# Patient Record
Sex: MALE | Race: WHITE
[De-identification: names, ages, dates, MRNs, and addresses within clinical notes are randomized per-mention and may not be internally consistent; named-entity substitution may affect disease eponyms.]

---

## 2020-09-05 ENCOUNTER — HOSPITAL ENCOUNTER (EMERGENCY)
Dept: HOSPITAL 74 - FER | Age: 35
Discharge: HOME | End: 2020-09-05
Payer: COMMERCIAL

## 2020-09-05 ENCOUNTER — HOSPITAL ENCOUNTER (EMERGENCY)
Dept: HOSPITAL 74 - JVIRT | Age: 35
Discharge: HOME | End: 2020-09-05
Payer: COMMERCIAL

## 2020-09-05 VITALS — SYSTOLIC BLOOD PRESSURE: 161 MMHG | DIASTOLIC BLOOD PRESSURE: 91 MMHG | HEART RATE: 91 BPM | TEMPERATURE: 98.8 F

## 2020-09-05 VITALS — BODY MASS INDEX: 38 KG/M2

## 2020-09-05 DIAGNOSIS — Z01.84: Primary | ICD-10-CM

## 2020-09-05 DIAGNOSIS — S93.402A: Primary | ICD-10-CM

## 2020-09-05 DIAGNOSIS — Z11.59: ICD-10-CM

## 2020-09-05 DIAGNOSIS — R10.9: ICD-10-CM

## 2020-09-05 PROCEDURE — U0003 INFECTIOUS AGENT DETECTION BY NUCLEIC ACID (DNA OR RNA); SEVERE ACUTE RESPIRATORY SYNDROME CORONAVIRUS 2 (SARS-COV-2) (CORONAVIRUS DISEASE [COVID-19]), AMPLIFIED PROBE TECHNIQUE, MAKING USE OF HIGH THROUGHPUT TECHNOLOGIES AS DESCRIBED BY CMS-2020-01-R: HCPCS

## 2020-09-05 NOTE — TELE
HPI


Do you have fever,cough or shortness of breath?: No





- General


Reason For Visit: COVILD 19 TEST


Time Seen by Provider: 09/05/20 14:11


History Source: Patient


Exam Limitations: No Limitations





- History of Present Illness





09/05/20 14:11


History of presenting illness: 34-year-old male with telehealth visit for 

healing well COVID testing.  Patient reports he has had diarrhea over the past 5

to 6 days which she reports is yellow.  He states he had one episode of blood-

streaked but attributed to increased rectal irritation from multiple episodes of

diarrhea.  Patient denied abdominal pain.  Patient reports his wife and children

are having similar symptoms and nobody has had fevers.  Patient states his wife 

told him to call and get tested for COVID.





CONSTITUTIONAL:


Absent: fever, chills, diaphoresis, generalized weakness, malaise, loss of 

appetite


HEENT:


Absent: rhinorrhea, nasal congestion, throat pain, throat swelling, difficulty 

swallowing, mouth swelling, ear pain, eye pain, visual changes


CARDIOVASCULAR:


Absent: chest pain, loss of consciousness, palpitations, irregular heart rate, 

peripheral edema


RESPIRATORY:


Absent: cough, shortness of breath, dyspnea with exertion, orthopnea, wheezing, 

stridor, hemoptysis


GASTROINTESTINAL: +diarrhea


Absent: abdominal pain, abdominal distension, nausea, vomiting


SKIN:


Absent: rash, itching, pallor


NEUROLOGIC:


Absent: headache, focal weakness or paresthesias, dizziness, unsteady gait, 

seizure, mental status changes, bladder or bowel incontinence


PSYCHIATRIC:


Absent: anxiety, depression, suicidal or homicidal ideation, hallucinations.





GENERAL:


Well developed, well nourished. Awake and alert. No acute distress.


HEENT:


Normocephalic, atraumatic. PERRLA, EOMI.


NECK:


Supple. Full ROM.


PULMONARY:


No evidence of respiratory distress.


EXTREMITIES:


No cyanosis.


SKIN:


Warm and dry. Normal capillary refill. No rashes. No jaundice.


NEUROLOGICAL:


Alert, awake, appropriate.


PSYCHIATRIC:


Cooperative. Good eye contact. Appropriate mood and affect.











- Medical Decision Making





09/05/20 14:16


A/P:


34-year-old male requesting COVID testing for diarrhea for 5 days





Patient reports stools have become more formed over the past 24 hours still have

a yellowish hue.  Denies rectal bleeding after one episode which he attributed 

to rectal pain due to increased liquid bowel movements.





COVID testing


COVID antibody test


Patient has been instructed to present to the emergency department for continued

evaluation if diarrhea persists.  Patient has verbalized understanding of when 

to return to emergency department for in person evaluation.


Discharge





Discharge


Diagnosis at time of Disposition: 


 Counseled about COVID-19 virus infection








- Referrals





- Patient Instructions


Additional Discharge Instructions: 


You were tested for COVID today.


Please isolate yourself until your test results come back.  Guidance has been 

provided in your discharge papers


You should receive a call within 24 to 48 hours from our department with your 

results.


Thank you for using our telehealth service today!





- Discharge


Disposition: HOME


Condition at time of Disposition: Stable

## 2020-09-05 NOTE — PDOC
History of Present Illness





- General


Chief Complaint: Pain, Acute


Stated Complaint: diarrhea,fall


Time Seen by Provider: 09/05/20 15:03


History Source: Patient


Exam Limitations: No Limitations





- History of Present Illness


Initial Comments: 





09/05/20 15:08


35 yo male no sig medical hx presents to the ED after a slip and fall. Pt states

this morning while walking down the steps he slipped on a child toy, twisted his

left ankle and landed on his right flank. Pt presented to the hospital for 

outpatient COVID testing however, after mentioning the fall to staff, he stated 

since he was already in the building and the wait was not long, he wanted to get

checked out. 





Pt states he was able to ambulate on the ankle immediately after the fall 

without significant pain, denies point tenderness to the foot or ankle. Pt 

states he has pain to the area on his right flank that he landed on that feels 

like a bruise without radiation, abdominal pain/swelling, no changes in pain 

with deep breathing or noted bruising to the skin. 











Past History





- Medical History


Allergies/Adverse Reactions: 


                                    Allergies











Allergy/AdvReac Type Severity Reaction Status Date / Time


 


No Allergy Information Allergy   Unverified 09/05/20 14:32





Available     














**Review of Systems





- Review of Systems


Constitutional: Yes: Symptoms Reported


Respiratory: Yes: Symptoms reported


Cardiac (ROS): Yes: Symptoms Reported


ABD/GI: Yes: Symptoms Reported


: Yes: Symptoms Reported


Musculoskeletal: Yes: Symptoms Reported


Integumentary: Yes: Symptoms Reported





*Physical Exam





- Physical Exam


General Appearance: Yes: Nourished, Appropriately Dressed.  No: Apparent 

Distress


HEENT: positive: EOMI


Neck: positive: Supple.  negative: Tender midline


Respiratory/Chest: positive: Lungs Clear, Normal Breath Sounds.  negative: Chest

Tender, Accessory Muscle Use, Rapid RR, Decreased Breath Sounds, Crackles, 

Rales, Rhonchi, Stridor, Wheezing


Cardiovascular: positive: Regular Rhythm, Regular Rate, S1, S2.  negative: 

Edema, JVD, Murmur


Vascular Pulses: Dorsalis-Pedis (R): 4+, Doralis-Pedis (L): 4+


Gastrointestinal/Abdominal: positive: Flat, Soft.  negative: Pulsatile Mass, 

Increased Bowel Sounds, Protuberent, Distended, Guarding, Rebound, Tenderness


Musculoskeletal: positive: Other (no bruising or deformity to the right flank, 

no change in pain with deep breathing).  negative: CVA Tenderness


Extremity: positive: Normal Capillary Refill, Normal Inspection, Normal Range of

Motion, Pelvis Stable, Other (no point tenderness to the ankle, no swelling. Pt 

ambulates without difficulty ).  negative: Pedal Edema


Integumentary: positive: Normal Color, Dry, Warm.  negative: Swelling


Neurologic: positive: CNs II-XII NML intact, Fully Oriented, Alert, Normal 

Mood/Affect, Normal Response, Motor Strength 5/5.  negative: Confused





Medical Decision Making





- Medical Decision Making





09/05/20 15:20


35 yo male no sig medical hx presents to the ED after a slip and fall. Pt states

this morning while walking down the steps he slipped on a child toy, twisted his

left ankle and landed on his right flank. Pt presented to the hospital for 

outpatient COVID testing however, after mentioning the fall to staff, he stated 

since he was already in the building and the wait was not long, he wanted to get

checked out. 





Pt states he was able to ambulate on the ankle immediately after the fall 

without significant pain, denies point tenderness to the foot or ankle. Pt 

states he has pain to the area on his right flank that he landed on that feels 

like a bruise without radiation, abdominal pain/swelling, no changes in pain 

with deep breathing or noted bruising to the skin. 





Denies changes in bowel or bladder habits, midline spine tenderness 











vitals stable





see PE





Pt safe for DC home with PCP f/u 





No imaging indicated due to ambulating and no point tenderness to ankle. No 

bruise, abdominal pain, pain on deep breathing to right flank





Discharge





- Discharge Information


Problems reviewed: Yes


Clinical Impression/Diagnosis: 


 Fall





Condition: Stable


Disposition: HOME





- Admission


No





- Follow up/Referral





- Patient Discharge Instructions


Patient Printed Discharge Instructions:  How to Prevent Falls, DI for Ankle Pain


Additional Instructions: 


Please see your Primary Doctor within the next 48 hours. Rest, Ice, compress and

elevate your ankle and take over the counter pain medication as needed and 

described on the packaging. Return to the ER for new or concerning symptoms.





THank you





- Post Discharge Activity

## 2020-09-05 NOTE — PDOC
Documentation entered by Nilo Vallejo SCRIBE, acting as scribe for 

Jackie Rankin MD.








Jackie Rankin MD:  This documentation has been prepared by the sophieibe, 

Nilo Vallejo SCRIBE, under my direction and personally reviewed by me in its

entirety.  I confirm that the documentation accurately reflects all work, 

treatment, procedures, and medical decision making performed by me.  





Attending Attestation





- Resident


Resident Name: Turner Flores





- ED Attending Attestation


I have performed the following: I have examined & evaluated the patient, The 

case was reviewed & discussed with the resident, I agree w/resident's findings &

plan, Exceptions are as noted





- HPI


HPI: 





09/05/20 15:15


The patient is a 34 year old male with no significant past medical history who 

presents to the emergency department, originally coming for COVID-19 test, for 

evaluation of right side pain s/p a mechanical fall this morning. The patient 

reports he was walking down stairs and fell after he stepped on a toy, causing 

him to twist his ankle. He endorses right side, R hip, and L ankle pain. He 

reports initially presenting for COVID-19 test because he had diarrhea for 5 

days (now resolved) and his family members who he lives with have diarrhea and 

fevers.


 


The patient denies chest/abdominal pain, cough, and shortness of breath. Denies 

fever, chills, nausea, vomiting, and/or any  symptoms. Denies any other sym

ptoms. 





Allergies: Not Available





PCP: None








- Physicial Exam


PE: 





09/05/20 15:09


General: well appearing


Chest: CTAB, good air entry, no wheezes rales or rhonchi, no chest wall/rib ttp,

no stepoffs


CVS: + s1 s2, RRR


Back: no midline or paraspinal tenderness


Extremities: ankle non-tender, no swelling or increased warmth or deformities, 

dorsiflexion/plantarflexion 5/5 b/l, ambulatory with stead gait





- Medical Decision Making





09/05/20 15:11


33 yo M with R sided pain and L ankle pain s/p mechanical fall down 2 steps 

today, unremarkable physical exam, no SOB or pain with inspiration to suggest 

rib fx and lungs CTAB with good O2 sat so unlikely clinically significant PTX. 

No ankle ttp or restricted range of motion to suggest fx or dislocation, more 

likely sprain/contusion. 





Plan:


-d/c with return precautions, recommend tylenol or motrin at hoem as needed 

forpain and PMD f/u as needed





This clinical encounter is taking place during a federal and state health care 

emergency attributable to the novel Corona Virus pandemic.


The [place] of the Department of Health and Human Services has declared, 

pursuant to the Public Health Service Act  319F-3 (42 U.S.C.  247d-6d), that a

covered persons activities related to medical countermeasures against COVID-19 

will be immune from liability under Federal and State law.





Discharge





- Discharge Information


Problems reviewed: Yes


Clinical Impression/Diagnosis: 


 Fall





Condition: Stable


Disposition: HOME





- Follow up/Referral





- Patient Discharge Instructions


Patient Printed Discharge Instructions:  How to Prevent Falls, DI for Ankle Pain


Additional Instructions: 


Please see your Primary Doctor within the next 48 hours. Rest, Ice, compress and

elevate your ankle and take over the counter pain medication as needed and 

described on the packaging. Return to the ER for new or concerning symptoms.





THank you





- Post Discharge Activity

## 2020-10-09 ENCOUNTER — HOSPITAL ENCOUNTER (EMERGENCY)
Dept: HOSPITAL 74 - JVIRT | Age: 35
Discharge: HOME | End: 2020-10-09
Payer: COMMERCIAL

## 2020-10-09 DIAGNOSIS — Z03.818: Primary | ICD-10-CM

## 2020-10-09 PROCEDURE — C9803 HOPD COVID-19 SPEC COLLECT: HCPCS

## 2020-10-09 PROCEDURE — U0003 INFECTIOUS AGENT DETECTION BY NUCLEIC ACID (DNA OR RNA); SEVERE ACUTE RESPIRATORY SYNDROME CORONAVIRUS 2 (SARS-COV-2) (CORONAVIRUS DISEASE [COVID-19]), AMPLIFIED PROBE TECHNIQUE, MAKING USE OF HIGH THROUGHPUT TECHNOLOGIES AS DESCRIBED BY CMS-2020-01-R: HCPCS

## 2020-10-09 NOTE — TELE
HPI


Do you have fever,cough or shortness of breath?: No





- General


Reason For Visit: COVID 19 TEST


History Source: Patient


Exam Limitations: No Limitations





- History of Present Illness





10/09/20 10:44


Patient is a 35-year-old male with a history of asthma who presents for a 

virtual urgent care visit secondary to flulike symptoms.  He has had a cough, 

nasal congestion, sputum production and a scratchy throat for the last 2 days.  

He denies any fevers or chills.  He had a slight headache yesterday which 

resolved with Tylenol.  He does admit to traveling to [place] 1 week ago.  He 

denies any travel outside of the  within the last 30 days.  He denies any 

known COVID contacts.  He denies any allergies to medications.  He is requesting

a COVID swab to ensure that his illness is not secondary to COVID.  He is also 

requesting an albuterol inhaler secondary to being an asthmatic.





Of note: This virtual urgent care visit was done via audio only as the patient 

was having difficulty connecting to the video visit








Past History





- Medical History


Allergies/Adverse Reactions: 


                                    Allergies











Allergy/AdvReac Type Severity Reaction Status Date / Time


 


No Allergy Information Allergy   Unverified 09/05/20 14:32





Available     











Home Medications: 


Ambulatory Orders





Albuterol Sulfate [Albuterol Sulfate Hfa] 2 puff IH Q4H PRN #1 [email or web address] 

10/09/20 








Asthma: Yes


COPD: No


Thyroid Disease: Yes (SARCADOSIS)





- Immunization History


Immunization Up to Date: Yes





- Psycho-Social/Smoking History


Smoking History: Never smoked


Have you smoked in the past 12 months: No





**Review of Systems





- Review of Systems


Comments:: 





10/09/20 11:00


- Review of Systems


Able to Perform ROS?: Yes


Constitutional: No: Fever, Chills, Loss of Appetite, Night Sweats, Weakness


HEENTM: Positive: Sore throat, nasal congestion


Respiratory: No: Shortness of Breath, Wheezing; positive: Cough and sputum 

production


Cardiac (ROS): No: Chest Pain, Chest Tightness, Palpitations, Irregular Heart 

Beat, Edema


ABD/GI: No: Nausea, Vomiting, Abdominal Pain, Diarrhea


: No Dysuria, No Hematuria, No Frequency, No Urgency


Musculoskeletal: No: Muscle Pain, Back Pain, Joint Pain, Muscle Weakness, Neck 

Pain


Integumentary: No: Lesions, Rash


Neurological: No: Headache, Numbness, Tingling, Weakness, Speech Difficulties





*Physical Exam





- Physical Exam





10/09/20 11:01


- Physical Exam


HEENT: Normal Voice, Hearing Grossly Normal


Respiratory/Chest: Speaking in full and complete sentences


Neurologic: Fully Oriented, Alert, Normal Mood/Affect, Normal Response





- Medical Decision Making





10/09/20 11:03


Assessment: Patient is a 35-year-old male with flulike illness who would like a 

COVID swab.





Plan:


-COVID swab ordered


-Patient to proceed to the [facility] for his testing


-COVID counseling given, isolation precautions reviewed


-Albuterol sent to the patient's pharmacy


-Patient understands and agrees with this treatment and plan





Discharge


Diagnosis at time of Disposition: 


 Counseled about COVID-19 virus infection, Influenza-like illness








- Prescriptions


eRx: 


Albuterol Sulfate [Albuterol Sulfate Hfa] 2 puff IH Q4H PRN #1 [email or web address]


 PRN Reason: Wheezing





- Referrals





- Patient Instructions


Discharge Instructions:  [facility] Inst[facility], [facility] COVID-19 Isolation Protocol


Additional Discharge Instructions: 


You were seen via a telehealth visit and tested for COVID today.  You should 

follow isolation precautions as per [place] guidelines.  Thank you for 

participating in our telehealth medicine program.  If you have any worsening 

symptoms such as high fever, shaking chills, profuse vomiting or any other 

worsening symptoms you should go to your local emergency department immediately 

or follow up with your primary care doctor immediately.





If you become symptomatic:


Take Tylenol 650 mg every 6 hours as needed for fever or pain.  You may take 

Robitussin or other over-the-counter cough syrup.  Follow the dosing 

instructions on the bottle.


Warm tea, honey, and salt water gargles may help your symptoms.  Please take 

precautions and self quarantine for 2 weeks and follow-up with your primary care

doctor and the Department of Health.





Return to the nearest emergency department for shortness of breath, difficulty 

breathing, chest pain, or if you have any changes in your symptoms.





- Discharge


Disposition: HOME


Condition at time of Disposition: Stable

## 2020-10-09 NOTE — XMS
Summarization Of Episode

                           Created on:2020



Patient:DOROTHY WESLEY

Sex:Male

:1985

External Reference #:[id number]





Demographics







                          Address                   [address] [place] [address]

 

                          Home Phone                [phone] CELL

 

                          Work Phone                [phone]

 

                          Preferred Language        English

 

                          Marital Status            Not  or 

 

                          Adventism Affiliation     NO

 

                          Race                      WH

 

                          Ethnic Group              Not  or 









Author







                          Organization              [facility]









Support







                Name            Relationship    Address         Phone

 

                [employer] Unavailable     [address]    [phone]



                                                [place] [address] 

 

                PIOTR WESLEY WIFE            [address] [phone] [address]

[place] [address] 

 

                [employer] Unavailable     [address]    [phone]



                                                [place] [address] 

 

                SAE WESLEY WIFE            [address] [phone] [address]

[place] [address] 

 

                PIOTR WESLEY Spouse          [address] Unavailable



                                                [place] [address] 

 

                SAE WESLEY Spouse          [address] [place] Unavailable



                                                [place] [address] 









Re-disclosure Warning

The records that you are about to access may contain information from federally-
assisted alcohol or drug abuse programs. If such information is present, then 
the following federally mandated warning applies: This information has been 
disclosed to you from records protected by federal confidentiality rules (42 CFR
part 2). The federal rules prohibit you from making any further disclosure of 
this information unless further disclosure is expressly permitted by the written
consent of the person to whom it pertains or as otherwise permitted by 42 CFR 
part 2. A general authorization for the release of medical or other information 
is NOT sufficient for this purpose. The Federal rules restrict any use of the 
information to criminally investigate or prosecute any alcohol or drug abuse 
patient.The records that you are about to access may contain highly sensitive 
health information, the redisclosure of which is protected by Article 27-F of 
the [facility] Public Health law. If you continue you may haveaccess to 
information: Regarding HIV / AIDS; Provided by facilities licensed or operated 
by the [facility] Office of Mental Health; or Provided by the [facility]
Office for People With Developmental Disabilities. If such information is 
present, then the following [place] mandated warning applies: This 
information has been disclosed to you from confidential records which are 
protected by state law. State law prohibits you from making any further 
disclosure of this information without the specific written consent of the 
person to whom it pertains, or as otherwise permitted by law. Any unauthorized 
further disclosure in violation of state law may result in a fine or intermediate 
sentence or both. A general authorization for the release of medical or other 
information is NOT sufficient authorization for further disclosure.



Insurance Providers







          Payer name Policy type / Policy ID Covered   Covered party's Policy   

 Plan



                    Coverage type           party ID  relationship to [employer]    

Information



                                                  ruiz              

 

          BC PPO              [id number]           SP                  [id number]

[id number]



                              59                                      







Results







                    ID                  Date                Data Source

 

                    [id number]         2020 02:45:00 PM EDT [facility]











          Name      Value     Range     Interpretation Description Data      Sup

porting



                                        Code                Source(s) Document(s

)

 

          SARS                                              [facility]   



          coronavirus 2                                                   



          RNA                                                         









                                        This lab was ordered by [facility] and reported by [facility].











                                        Procedure